# Patient Record
Sex: MALE | Race: WHITE | Employment: OTHER
[De-identification: names, ages, dates, MRNs, and addresses within clinical notes are randomized per-mention and may not be internally consistent; named-entity substitution may affect disease eponyms.]

---

## 2022-06-21 ENCOUNTER — TRANSCRIBE ORDERS (OUTPATIENT)
Dept: OTHER | Age: 72
End: 2022-06-21
Payer: COMMERCIAL

## 2022-06-21 DIAGNOSIS — I73.9 PERIPHERAL VASCULAR DISEASE (H): Primary | ICD-10-CM

## 2022-06-24 ENCOUNTER — TELEPHONE (OUTPATIENT)
Dept: VASCULAR SURGERY | Facility: CLINIC | Age: 72
End: 2022-06-24

## 2022-06-24 NOTE — TELEPHONE ENCOUNTER
New vascular referral   Referral Type: Vascular Surgery   Preferred Location: [facility] Vascular - Clinics & Surgery Center [facility]   Scheduling Instructions: Please call to schedule your appointment     Referring provider: Janeth Cotter NP @ [facility]   Phone [phone]   Fax [phone]      Most recent imaging? - 5/22: CTA aorta/iliac (awaiting imaging)    Additional info: 71 y/o male with PMHx iliac artery angioplasty and stenting (2005). 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 He is referred to [facility] vascular for further workup and treatment.    Will route this to IR team to discuss with their RNCC who typically sees AVM patients.    THERESA Graham, RN  RNCC - [facility] Vascular Surgery  Ph: [phone]  Fax: [phone]

## 2022-06-28 NOTE — TELEPHONE ENCOUNTER
I called and spoke with Deni.  His son  last week and he has been having a rough time with that.  He has pain in his calves bilateral and is only able to walk about 50 feet, he uses a cane/walker.  He does have low back issues and hasn't fallen yet, but feels unsteady.  He has lost muscle mass.  He continues to smoke.  He is getting a CT chest asap at [facility] in [place], he is also get's his care at [facility].  I will get the CYNDIE, US bilat venous duplex (hx bilat dvt and PE last fall), angio procedure imaging from [facility] for review and then contact pt for needed appointments.  MARGARETH Hassan RN, BSN  Interventional Radiology Nurse Coordinator   Phone:  [phone]

## 2022-07-05 ENCOUNTER — TELEPHONE (OUTPATIENT)
Dept: VASCULAR SURGERY | Facility: CLINIC | Age: 72
End: 2022-07-05

## 2022-07-05 DIAGNOSIS — Q27.9 VASCULAR MALFORMATION: Primary | ICD-10-CM

## 2022-07-05 NOTE — TELEPHONE ENCOUNTER
[facility] Call Center    Phone Message    May a detailed message be left on voicemail: no     Reason for Call: Other: I73.9 (ICD-10-CM) - Peripheral vascular disease (H)/ Referral by: RICH MARTÍNEZ A- Leslie Cheema at: [phone]     Action Taken: Message routed to:  [facility] ([facility]): [facility]    Travel Screening: Not Applicable

## 2022-07-06 ENCOUNTER — TELEPHONE (OUTPATIENT)
Dept: VASCULAR SURGERY | Facility: CLINIC | Age: 72
End: 2022-07-06

## 2022-07-06 NOTE — TELEPHONE ENCOUNTER
[facility] Call Center    Phone Message    May a detailed message be left on voicemail: yes     Reason for Call: Other: Pt called back to be scheduled.  Referral from     Janeth Cotter NP @ [facility]  Phone [phone]  Fax [phone]      For Peripheral vascular disease (H) [I73.9].     Please call Pt back to schedule. Thank you.     Action Taken: Message routed to:  [facility] ([facility]): Vascular    Travel Screening: Not Applicable

## 2022-07-06 NOTE — TELEPHONE ENCOUNTER
Called and spoke with patient regarding scheduling. Told patient we are waiting on imaging to be received. Request for imaging is sent. Explained to patient that once we receive that and a provider reviews it we'll be able to get him scheduled.     Gem Patel RN, BSN   Interventional Radiology CC   [phone]

## 2022-07-11 NOTE — TELEPHONE ENCOUNTER
Yahaira from [employer] calling to follow-up and ensure that the vet receives a call back to schedule the referral. Deni [phone]

## 2022-08-04 NOTE — TELEPHONE ENCOUNTER
DIAGNOSIS: MRI left leg and MRA left leg to be done here at the [facility], these are specially protocolled for vascular malformations.    DATE RECEIVED: 8.31.22   NOTES STATUS DETAILS   OFFICE NOTE from referring provider CE 7.14.22, 6.14.22, 6.8.22  [facility]   OFFICE NOTE from other specialist CE 5.24.22, 3.30.22  [facility]   MEDICATION LIST CE    XRAYS (IMAGES & REPORTS) Pacs *sched* for 8.29.22  MRA Low Ext Left  MR Femur Thigh Left    7.6.22  CT Chest    5.24.22  IR Abd    5.24.22  US Vascular Access    4.26.22  CTA Abd/Pelvis    3.30.22  US Doppler Artery Leg Billy    1.26.22  US Doppler PVR Seg Pressre

## 2022-08-29 ENCOUNTER — HOSPITAL ENCOUNTER (OUTPATIENT)
Dept: MRI IMAGING | Facility: CLINIC | Age: 72
Discharge: HOME | End: 2022-08-29
Attending: RADIOLOGY | Admitting: RADIOLOGY
Payer: COMMERCIAL

## 2022-08-29 DIAGNOSIS — Q27.9 VASCULAR MALFORMATION: ICD-10-CM

## 2022-08-29 PROCEDURE — 255N000002 HC RX 255 OP 636: Performed by: RADIOLOGY

## 2022-08-29 PROCEDURE — 73720 MRI LWR EXTREMITY W/O&W/DYE: CPT | Mod: LT

## 2022-08-29 PROCEDURE — A9577 INJ MULTIHANCE: HCPCS | Performed by: RADIOLOGY

## 2022-08-29 PROCEDURE — 73725 MR ANG LWR EXT W OR W/O DYE: CPT | Mod: 26 | Performed by: RADIOLOGY

## 2022-08-29 PROCEDURE — 73725 MR ANG LWR EXT W OR W/O DYE: CPT | Mod: LT

## 2022-08-29 PROCEDURE — 73720 MRI LWR EXTREMITY W/O&W/DYE: CPT | Mod: 26 | Performed by: RADIOLOGY

## 2022-08-29 RX ADMIN — GADOBENATE DIMEGLUMINE 15 ML: 529 INJECTION, SOLUTION INTRAVENOUS at 12:34

## 2022-08-30 PROBLEM — J44.9 CHRONIC OBSTRUCTIVE PULMONARY DISEASE, UNSPECIFIED (H): Status: ACTIVE | Noted: 2022-08-30

## 2022-08-30 PROBLEM — I73.9 PVD (PERIPHERAL VASCULAR DISEASE) (H): Status: ACTIVE | Noted: 2022-05-24

## 2022-08-30 PROBLEM — F12.20 CONTINUOUS CANNABIS DEPENDENCE (H): Status: ACTIVE | Noted: 2022-08-30

## 2022-08-30 PROBLEM — Z86.711 PERSONAL HISTORY OF PULMONARY EMBOLISM: Status: ACTIVE | Noted: 2022-08-30

## 2022-08-30 PROBLEM — F33.1 MAJOR DEPRESSIVE DISORDER, RECURRENT, MODERATE (H): Status: ACTIVE | Noted: 2022-08-30

## 2022-08-30 PROBLEM — M47.817 SPONDYLOSIS OF LUMBOSACRAL SPINE WITHOUT MYELOPATHY: Status: ACTIVE | Noted: 2022-08-30

## 2022-08-30 PROBLEM — F10.21 OTHER AND UNSPECIFIED ALCOHOL DEPENDENCE, IN REMISSION: Status: ACTIVE | Noted: 2022-08-30

## 2022-08-30 PROBLEM — K63.5 POLYP OF COLON: Status: ACTIVE | Noted: 2022-08-30

## 2022-08-30 PROBLEM — Z87.81 PERSONAL HISTORY OF TRAUMATIC FRACTURE: Status: ACTIVE | Noted: 2022-08-30

## 2022-08-30 PROBLEM — F17.200 NICOTINE DEPENDENCE: Status: ACTIVE | Noted: 2022-08-30

## 2022-08-30 PROBLEM — G89.29 OTHER CHRONIC PAIN: Status: ACTIVE | Noted: 2022-08-30

## 2022-08-30 PROBLEM — H91.92 DEAFNESS IN LEFT EAR: Status: ACTIVE | Noted: 2022-08-30

## 2022-08-30 PROBLEM — K25.3 ACUTE GASTRIC ULCER: Status: ACTIVE | Noted: 2022-08-30

## 2022-08-30 PROBLEM — M25.519 SHOULDER PAIN: Status: ACTIVE | Noted: 2022-08-30

## 2022-08-30 PROBLEM — I26.99 PULMONARY EMBOLISM (H): Status: ACTIVE | Noted: 2022-08-30

## 2022-08-30 PROBLEM — E78.00 HYPERCHOLESTEROLEMIA: Status: ACTIVE | Noted: 2022-08-30

## 2022-08-30 PROBLEM — I73.9 CLAUDICATION OF BOTH LOWER EXTREMITIES (H): Status: ACTIVE | Noted: 2022-04-12

## 2022-08-30 PROBLEM — Z86.018 HISTORY OF ACOUSTIC NEUROMA: Status: ACTIVE | Noted: 2022-08-30

## 2022-08-30 PROBLEM — F41.1 GENERALIZED ANXIETY DISORDER: Status: ACTIVE | Noted: 2022-08-30

## 2022-08-30 PROBLEM — I77.9 PERIPHERAL ARTERIAL OCCLUSIVE DISEASE (H): Status: ACTIVE | Noted: 2022-08-30

## 2022-08-30 PROBLEM — E55.9 VITAMIN D DEFICIENCY: Status: ACTIVE | Noted: 2022-08-30

## 2022-08-30 PROBLEM — M79.606 PAIN IN LEG, UNSPECIFIED: Status: ACTIVE | Noted: 2022-08-30

## 2022-08-30 PROBLEM — M77.40 METATARSALGIA: Status: ACTIVE | Noted: 2022-08-30

## 2022-08-30 PROBLEM — E66.9 OBESITY: Status: ACTIVE | Noted: 2022-08-30

## 2022-08-30 PROBLEM — G64 DISORDER OF PERIPHERAL NERVOUS SYSTEM: Status: ACTIVE | Noted: 2022-08-30

## 2022-08-30 PROBLEM — M54.50 LOW BACK PAIN: Status: ACTIVE | Noted: 2022-08-30

## 2022-08-30 PROBLEM — F10.10 CHRONIC ALCOHOL ABUSE: Status: ACTIVE | Noted: 2022-08-30

## 2022-08-30 PROBLEM — R06.02 SHORTNESS OF BREATH: Status: ACTIVE | Noted: 2022-08-30

## 2022-08-31 ENCOUNTER — PRE VISIT (OUTPATIENT)
Dept: RADIOLOGY | Facility: CLINIC | Age: 72
End: 2022-08-31

## 2022-08-31 ENCOUNTER — VIRTUAL VISIT (OUTPATIENT)
Dept: RADIOLOGY | Facility: CLINIC | Age: 72
End: 2022-08-31
Attending: RADIOLOGY
Payer: COMMERCIAL

## 2022-08-31 DIAGNOSIS — I99.9 VASCULAR LESION: Primary | ICD-10-CM

## 2022-08-31 PROCEDURE — 99207 PR NO BILLABLE SERVICE THIS VISIT: CPT | Performed by: RADIOLOGY

## 2022-08-31 RX ORDER — GABAPENTIN 300 MG/1
600 CAPSULE ORAL
COMMUNITY
Start: 2022-03-02

## 2022-08-31 RX ORDER — TIOTROPIUM BROMIDE 18 UG/1
CAPSULE ORAL; RESPIRATORY (INHALATION)
COMMUNITY

## 2022-08-31 RX ORDER — VENLAFAXINE 75 MG/1
75 TABLET ORAL
COMMUNITY

## 2022-08-31 RX ORDER — LIDOCAINE 50 MG/G
PATCH TOPICAL
Status: ON HOLD | COMMUNITY
Start: 2022-06-22 | End: 2023-03-07

## 2022-08-31 RX ORDER — ACETAMINOPHEN 500 MG
1000 TABLET ORAL
COMMUNITY
Start: 2022-06-08

## 2022-08-31 RX ORDER — ALBUTEROL SULFATE 90 UG/1
AEROSOL, METERED RESPIRATORY (INHALATION)
COMMUNITY
Start: 2022-05-02

## 2022-08-31 RX ORDER — MIRTAZAPINE 30 MG/1
60 TABLET, FILM COATED ORAL
COMMUNITY
Start: 2022-06-08

## 2022-08-31 NOTE — PROGRESS NOTES
Deni is a 72 year old who is being evaluated via a billable telephone visit.      Patient stated he is in the state of [place] for the visit today.    What phone number would you like to be contacted at? [phone]  How would you like to obtain your AVS? Lucianhart      Patient unable to come to visit. Will reschedule.    Romelia Mai MD  Interventional Radiology   Pager [phone]

## 2022-08-31 NOTE — NURSING NOTE
Deni Salcido 72 year old male presents today to discuss MRI results.    Alisson Herrera, Virtual Facilitator

## 2022-08-31 NOTE — LETTER
8/31/2022         RE: Deni Salcido  [address] [place], [place] [address] [address]   [address]  [place] [address]        Dear Colleague,    Thank you for referring your patient, Deni Salcido, to the [facility]. Please see a copy of my visit note below.    Deni is a 72 year old who is being evaluated via a billable telephone visit.      Patient stated he is in the state of [place] for the visit today.    What phone number would you like to be contacted at? [phone]  How would you like to obtain your AVS? MyChart      Patient unable to come to visit. Will reschedule.        Again, thank you for allowing me to participate in the care of your patient.      Sincerely,    Romelia Mai MD

## 2022-10-05 ENCOUNTER — OFFICE VISIT (OUTPATIENT)
Dept: DERMATOLOGY | Facility: CLINIC | Age: 72
End: 2022-10-05
Attending: RADIOLOGY
Payer: COMMERCIAL

## 2022-10-05 VITALS — HEIGHT: 75 IN | BODY MASS INDEX: 33.11 KG/M2 | WEIGHT: 266.32 LBS

## 2022-10-05 DIAGNOSIS — I99.9 VASCULAR LESION: Primary | ICD-10-CM

## 2022-10-05 PROCEDURE — 99205 OFFICE O/P NEW HI 60 MIN: CPT | Performed by: RADIOLOGY

## 2022-10-05 PROCEDURE — G0463 HOSPITAL OUTPT CLINIC VISIT: HCPCS

## 2022-10-05 ASSESSMENT — PAIN SCALES - GENERAL: PAINLEVEL: WORST PAIN (10)

## 2022-10-05 NOTE — LETTER
"10/5/2022      RE: Deni Salcido  [address]  [place] [address]     Dear Colleague,    Thank you for the opportunity to participate in the care of your patient, Deni Salcido, at the [facility] at [facility]. Please see a copy of my visit note below.        INTERVENTIONAL RADIOLOGY CONSULTATION  -  Name: Deni Salcido  Age: 72 year old   Referring Physician: Cyndee Fowler NP [facility].  REASON FOR REFERRAL: Vascular lesion    HPI: Mr. Salcido is referred by the [facility] to discuss treatment options for a vascular lesion.    He is a 72-year-old male with complex medical history including PE, COPD, claudication and peripheral arterial disease status post iliac artery angioplasty and stenting in .  Due to some concern for left leg claudication symptoms, he underwent a left lower extremity angiogram, there was concern on that study for an AVM.     His son recently , and we spent much of our visit discussing this.  He has been understandably grief stricken by this.  He however feels safe in his home and is not a threat to himself.    With regards to his left leg, he denies left thigh or groin pain or swelling.  He denies vascular claudication.    However, he spent much of the visit discussing his severe back pain and radiating pain to both legs.  He notices that he does have some relief of his pain with bending over. The pain is daily, \" excruciating\" and limits his function.    Review of systems is negative for fever, dyspnea, cough, chest pain, nausea or vomiting, lower extremity swelling or discoloration.        PAST MEDICAL HISTORY:   No past medical history on file.    PAST SURGICAL HISTORY:   No past surgical history on file.    FAMILY HISTORY:   No family history on file.    SOCIAL HISTORY:   Social History     Tobacco Use     Smoking status: Current Every Day Smoker     Types: " Cigarettes     Smokeless tobacco: Never Used   Substance Use Topics     Alcohol use: Not on file       PROBLEM LIST:   Patient Active Problem List    Diagnosis Date Noted     Acute gastric ulcer 08/30/2022     Priority: Medium     4/18 EGD  Jun 01, 2018 Entered By: LANDON STANFORD Comment: 4/18 EGD       Chronic obstructive pulmonary disease, unspecified (H) 08/30/2022     Priority: Medium     Feb 04, 2021 Entered By: TANYA VILLANUEVA Comment: PFTs SCVA 1/2021       Chronic alcohol abuse 08/30/2022     Priority: Medium     Continuous cannabis dependence (H) 08/30/2022     Priority: Medium     Deafness in left ear 08/30/2022     Priority: Medium     Degeneration of intervertebral disc 08/30/2022     Priority: Medium     Disorder of peripheral nervous system 08/30/2022     Priority: Medium     Generalized anxiety disorder 08/30/2022     Priority: Medium     History of acoustic neuroma 08/30/2022     Priority: Medium     Hypercholesterolemia 08/30/2022     Priority: Medium     Low back pain 08/30/2022     Priority: Medium     Metatarsalgia 08/30/2022     Priority: Medium     Major depressive disorder, recurrent, moderate (H) 08/30/2022     Priority: Medium     Nicotine dependence 08/30/2022     Priority: Medium     8/12 Chantix  Jan 16, 2013 Entered By: LANDON STANFORD Comment: 8/12 Chantix       Obesity 08/30/2022     Priority: Medium     Other and unspecified alcohol dependence, in remission 08/30/2022     Priority: Medium     Other chronic pain 08/30/2022     Priority: Medium     Pulmonary embolism (H) 08/30/2022     Priority: Medium     Sep 09, 2021 Entered By: TANYA VILLANUEVA Comment: 9/9/2021, [facility] ER, Bl PE, DVT, pneumonia       Pain in leg, unspecified 08/30/2022     Priority: Medium     Peripheral arterial occlusive disease (H) 08/30/2022     Priority: Medium     2005 Guidant Omnilink stent to left proximal common iliac, [facility]  May 30, 2017 Entered By: LANDON STANFORD Comment:  2005 Guidant Omnilink stent to left proximal common iliac, [facility]       Personal history of pulmonary embolism 2022     Priority: Medium     Personal history of traumatic fracture 2022     Priority: Medium     Polyp of colon 2022     Priority: Medium     Shortness of breath 2022     Priority: Medium     Shoulder pain 2022     Priority: Medium     Spondylosis of lumbosacral spine without myelopathy 2022     Priority: Medium     May 02, 2018 Entered By: LANDON STANFORD Comment:  MRI, mild spondylosis, no impingement       Vitamin D deficiency 2022     Priority: Medium     PVD (peripheral vascular disease) (H) 2022     Priority: Medium     Claudication of both lower extremities (H) 2022     Priority: Medium     Formatting of this note might be different from the original.  Added automatically from request for surgery [id number]         MEDICATIONS:   Prescription Medications as of 10/5/2022       Rx Number Disp Refills Start End Last Dispensed Date Next Fill Date Owning Pharmacy    acetaminophen (TYLENOL) 500 MG tablet    2022        Si,000 mg    Class: Historical    albuterol (PROAIR HFA/PROVENTIL HFA/VENTOLIN HFA) 108 (90 Base) MCG/ACT inhaler    2022        Sig: INHALE 2 PUFFS BY MOUTH FOUR TIMES A DAY AS NEEDED FOR BREATHING, WAIT AT LEAST 1-5 MINUTES BETWEEN EACH PUFF AS NEED FOR SHORTNESS OF BREATH    Class: Historical    gabapentin (NEURONTIN) 300 MG capsule    3/2/2022        Si mg    Class: Historical    lidocaine (LIDODERM) 5 % patch    2022        Class: Historical    Route: Transdermal    mirtazapine (REMERON) 30 MG tablet    2022        Si mg    Class: Historical    tiotropium (SPIRIVA) 18 MCG inhaled capsule            Class: Historical    Route: Inhalation    venlafaxine (EFFEXOR) 75 MG tablet            Sig: Take 75 mg by mouth    Class: Historical    Route: Oral          ALLERGIES:   Other  "environmental allergy    ROS:  As above      Physical Examination:   VITALS:   Ht 1.9 m (6' 2.8\")   Wt 120.8 kg (266 lb 5.1 oz)   BMI 33.46 kg/m    Constitutional: alert and no distress  Head: Normocephalic.   ENT: OP clear  Cardiovascular: RRR.   Respiratory: CTAB  Skin: No jaundice  Psychiatric: crying when talking about his son's recent death. Judgment and insight intact and mentation appears normal.  Vascular: No bruits are noted.  Left thigh without skin breakdown or increased visible vascularity.   Brachial  Radial  Ulnar  Femoral  Popliteal  DP  PT    Left       2/2  2/2    Right       2/2  2/2        Labs:    BMP RESULTS:  No results found for: NA, POTASSIUM, CHLORIDE, CO2, ANIONGAP, GLC, BUN, CR, GFRESTIMATED, GFRESTBLACK, TUSHAR     CBC RESULTS:  No results found for: WBC, RBC, HGB, HCT, MCV, MCH, MCHC, RDW, PLT    INR/PTT:  No results found for: INR, PTT    Diagnostic studies:   MRI of the left lower extremity without and with contrast and time resolved MR angiogram bilateral lower extremities.  The MRI demonstrates no increased vascularity or flow voids to suggest AVM or other vascular malformation.  There is no muscular edema or soft tissue overgrowth.  Time resolved MR angiogram shows discrete early filling of the right common femoral vein.  Bilateral lower extremity arteries are patent.  No evidence of AVM.    Radiologist interpretation:  IMPRESSION:  1.  No MRI evidence of vascular malformation is identified in the  visualized pelvis and bilateral thigh/femur. Refer to same day lower  extremity MRA to better identify questioned arteriovenous fistula.  2.  Moderate bilateral greater trochanteric bursitis.     Impression:     1. Left leg: Early draining common femoral vein, likely with  contribution from profunda femoris artery vs superficial femoral  artery. 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      2. Right leg: Patent exam of the " right lower extremity.         Assessment: 72-year-old male with history of COPD, pulmonary embolism, peripheral arterial disease with recent angiogram showing no significant disease but concern for an arteriovenous malformation.  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  I told him that this is very good news.  I explained that an AV fistula is treatable by either embolization or surgery.  If architecturally favorable, and embolization can be pursued.  I discussed the process of angiography and embolization including the risks of arterial injury, access site bleeding, nontarget embolization or technical failure or inability to close the fistula due to unfavorable architecture. I explained that if the architecture is unfavorable, a surgical approach will be needed.  He would like to proceed.    He also spent a significant amount of our visit time discussing his back and leg pain, which appears to very possibly be neurologic/degenerative disease in origin, although I am uncertain as this is not my area of expertise.  Given his severity of symptoms, he needs an MRI and a referral to see a neurosurgeon.    Plan:  1. AVF. Characterize with angiogram and embolize if architecture allows.  2. Referral to neurology and neurosurgery is needed with history of back and leg pain and DJD.    It was a pleasure to conduct this in-person visit with Mr. Salcido today.  Thank you for involving the interventional radiology service in his care.    I spent a total of 80 minutes face-to-face time on today's in person clinic visit, over 50% time was for counseling and care coordination.  In addition I spent 10 minutes reviewing imaging and 10 minutes completing documentation.    Romelia Mai MD  Interventional Radiology   Pager [phone]        CC  Patient Care Team:  No Ref-Primary, Physician as PCP - General  SELF, REFERRED          Please do not hesitate to contact me if you  have any questions/concerns.     Sincerely,       Romelia Mai MD

## 2022-10-05 NOTE — LETTER
Date:November 3, 2022      Patient was self referred, no letter generated. Do not send.        [facility] Health Information

## 2022-10-05 NOTE — PROGRESS NOTES
"    INTERVENTIONAL RADIOLOGY CONSULTATION  -  Name: Deni Salcido  Age: 72 year old   Referring Physician: Cyndee Fowler NP [facility].  REASON FOR REFERRAL: Vascular lesion    HPI: Mr. Salcido is referred by the  to discuss treatment options for a vascular lesion.    He is a 72-year-old male with complex medical history including PE, COPD, claudication and peripheral arterial disease status post iliac artery angioplasty and stenting in .  Due to some concern for left leg claudication symptoms, he underwent a left lower extremity angiogram, there was concern on that study for an AVM.     His son recently , and we spent much of our visit discussing this.  He has been understandably grief stricken by this.  He however feels safe in his home and is not a threat to himself.    With regards to his left leg, he denies left thigh or groin pain or swelling.  He denies vascular claudication.    However, he spent much of the visit discussing his severe back pain and radiating pain to both legs.  He notices that he does have some relief of his pain with bending over. The pain is daily, \" excruciating\" and limits his function.    Review of systems is negative for fever, dyspnea, cough, chest pain, nausea or vomiting, lower extremity swelling or discoloration.        PAST MEDICAL HISTORY:   No past medical history on file.    PAST SURGICAL HISTORY:   No past surgical history on file.    FAMILY HISTORY:   No family history on file.    SOCIAL HISTORY:   Social History     Tobacco Use     Smoking status: Current Every Day Smoker     Types: Cigarettes     Smokeless tobacco: Never Used   Substance Use Topics     Alcohol use: Not on file       PROBLEM LIST:   Patient Active Problem List    Diagnosis Date Noted     Acute gastric ulcer 2022     Priority: Medium      EGD  2018 Entered By: LANDON STANFORD Comment:  EGD       Chronic obstructive pulmonary disease, unspecified (H) " 08/30/2022     Priority: Medium     Feb 04, 2021 Entered By: TANYA VILLANUEVA Comment: PFTs [facility] 1/2021       Chronic alcohol abuse 08/30/2022     Priority: Medium     Continuous cannabis dependence (H) 08/30/2022     Priority: Medium     Deafness in left ear 08/30/2022     Priority: Medium     Degeneration of intervertebral disc 08/30/2022     Priority: Medium     Disorder of peripheral nervous system 08/30/2022     Priority: Medium     Generalized anxiety disorder 08/30/2022     Priority: Medium     History of acoustic neuroma 08/30/2022     Priority: Medium     Hypercholesterolemia 08/30/2022     Priority: Medium     Low back pain 08/30/2022     Priority: Medium     Metatarsalgia 08/30/2022     Priority: Medium     Major depressive disorder, recurrent, moderate (H) 08/30/2022     Priority: Medium     Nicotine dependence 08/30/2022     Priority: Medium     8/12 Chantix  Jan 16, 2013 Entered By: LANDON STANFORD Comment: 8/12 Chantix       Obesity 08/30/2022     Priority: Medium     Other and unspecified alcohol dependence, in remission 08/30/2022     Priority: Medium     Other chronic pain 08/30/2022     Priority: Medium     Pulmonary embolism (H) 08/30/2022     Priority: Medium     Sep 09, 2021 Entered By: TANYA VILLANUEVA Comment: 9/9/2021, [facility] ER, Bl PE, DVT, pneumonia       Pain in leg, unspecified 08/30/2022     Priority: Medium     Peripheral arterial occlusive disease (H) 08/30/2022     Priority: Medium     2005 Guidant Omnilink stent to left proximal common iliac, [facility]  May 30, 2017 Entered By: LANDON STANFORD Comment: 2005 Guidant Omnilink stent to left proximal common iliac, [facility]       Personal history of pulmonary embolism 08/30/2022     Priority: Medium     Personal history of traumatic fracture 08/30/2022     Priority: Medium     Polyp of colon 08/30/2022     Priority: Medium     Shortness of breath 08/30/2022     Priority: Medium     Shoulder pain  "2022     Priority: Medium     Spondylosis of lumbosacral spine without myelopathy 2022     Priority: Medium     May 02, 2018 Entered By: LANDON STANFORD Comment:  MRI, mild spondylosis, no impingement       Vitamin D deficiency 2022     Priority: Medium     PVD (peripheral vascular disease) (H) 2022     Priority: Medium     Claudication of both lower extremities (H) 2022     Priority: Medium     Formatting of this note might be different from the original.  Added automatically from request for surgery [id number]         MEDICATIONS:   Prescription Medications as of 10/5/2022       Rx Number Disp Refills Start End Last Dispensed Date Next Fill Date Owning Pharmacy    acetaminophen (TYLENOL) 500 MG tablet    2022        Si,000 mg    Class: Historical    albuterol (PROAIR HFA/PROVENTIL HFA/VENTOLIN HFA) 108 (90 Base) MCG/ACT inhaler    2022        Sig: INHALE 2 PUFFS BY MOUTH FOUR TIMES A DAY AS NEEDED FOR BREATHING, WAIT AT LEAST 1-5 MINUTES BETWEEN EACH PUFF AS NEED FOR SHORTNESS OF BREATH    Class: Historical    gabapentin (NEURONTIN) 300 MG capsule    3/2/2022        Si mg    Class: Historical    lidocaine (LIDODERM) 5 % patch    2022        Class: Historical    Route: Transdermal    mirtazapine (REMERON) 30 MG tablet    2022        Si mg    Class: Historical    tiotropium (SPIRIVA) 18 MCG inhaled capsule            Class: Historical    Route: Inhalation    venlafaxine (EFFEXOR) 75 MG tablet            Sig: Take 75 mg by mouth    Class: Historical    Route: Oral          ALLERGIES:   Other environmental allergy    ROS:  As above      Physical Examination:   VITALS:   Ht 1.9 m (6' 2.8\")   Wt 120.8 kg (266 lb 5.1 oz)   BMI 33.46 kg/m    Constitutional: alert and no distress  Head: Normocephalic.   ENT: OP clear  Cardiovascular: RRR.   Respiratory: CTAB  Skin: No jaundice  Psychiatric: crying when talking about his son's recent death. Judgment and " insight intact and mentation appears normal.  Vascular: No bruits are noted.  Left thigh without skin breakdown or increased visible vascularity.   Brachial  Radial  Ulnar  Femoral  Popliteal  DP  PT    Left       2/2  2/2    Right       2/2  2/2        Labs:    BMP RESULTS:  No results found for: NA, POTASSIUM, CHLORIDE, CO2, ANIONGAP, GLC, BUN, CR, GFRESTIMATED, GFRESTBLACK, TUSHAR     CBC RESULTS:  No results found for: WBC, RBC, HGB, HCT, MCV, MCH, MCHC, RDW, PLT    INR/PTT:  No results found for: INR, PTT    Diagnostic studies:   MRI of the left lower extremity without and with contrast and time resolved MR angiogram bilateral lower extremities.  The MRI demonstrates no increased vascularity or flow voids to suggest AVM or other vascular malformation.  There is no muscular edema or soft tissue overgrowth.  Time resolved MR angiogram shows discrete early filling of the right common femoral vein.  Bilateral lower extremity arteries are patent.  No evidence of AVM.    Radiologist interpretation:  IMPRESSION:  1.  No MRI evidence of vascular malformation is identified in the  visualized pelvis and bilateral thigh/femur. Refer to same day lower  extremity MRA to better identify questioned arteriovenous fistula.  2.  Moderate bilateral greater trochanteric bursitis.     Impression:     1. Left leg: Early draining common femoral vein, likely with  contribution from profunda femoris artery vs superficial femoral  artery. 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      2. Right leg: Patent exam of the right lower extremity.         Assessment: 72-year-old male with history of COPD, pulmonary embolism, peripheral arterial disease with recent angiogram showing no significant disease but concern for an arteriovenous malformation.  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  I told him that this is very good news.  I  explained that an AV fistula is treatable by either embolization or surgery.  If architecturally favorable, and embolization can be pursued.  I discussed the process of angiography and embolization including the risks of arterial injury, access site bleeding, nontarget embolization or technical failure or inability to close the fistula due to unfavorable architecture. I explained that if the architecture is unfavorable, a surgical approach will be needed.  He would like to proceed.    He also spent a significant amount of our visit time discussing his back and leg pain, which appears to very possibly be neurologic/degenerative disease in origin, although I am uncertain as this is not my area of expertise.  Given his severity of symptoms, he needs an MRI and a referral to see a neurosurgeon.    Plan:  1. AVF. Characterize with angiogram and embolize if architecture allows.  2. Referral to neurology and neurosurgery is needed with history of back and leg pain and DJD.    It was a pleasure to conduct this in-person visit with Mr. Salcido today.  Thank you for involving the interventional radiology service in his care.    I spent a total of 80 minutes face-to-face time on today's in person clinic visit, over 50% time was for counseling and care coordination.  In addition I spent 10 minutes reviewing imaging and 10 minutes completing documentation.    Romelia Mai MD  Interventional Radiology   Pager [phone]        CC  Patient Care Team:  No Ref-Primary, Physician as PCP - General  SELF, REFERRED

## 2022-10-05 NOTE — NURSING NOTE
"[facility] [[id number]]  Chief Complaint   Patient presents with     Consult     Vascular Lesion.     Initial Ht 6' 2.8\" (190 cm)   Wt 266 lb 5.1 oz (120.8 kg)   BMI 33.46 kg/m   Estimated body mass index is 33.46 kg/m  as calculated from the following:    Height as of this encounter: 6' 2.8\" (190 cm).    Weight as of this encounter: 266 lb 5.1 oz (120.8 kg).  Medication Reconciliation: complete    Does the patient need any medication refills today? No    Does the patient/parent need MyChart or Proxy acces today? No    Has the patient had their flu shot for this year? No    Would you like a flu shot today? No    Would you like the Covid vaccine today? No     Yessenia Sinha CMA        "

## 2022-11-15 ENCOUNTER — TELEPHONE (OUTPATIENT)
Dept: RADIOLOGY | Facility: CLINIC | Age: 72
End: 2022-11-15

## 2022-11-15 DIAGNOSIS — I77.0 A-V FISTULA (H): Primary | ICD-10-CM

## 2022-11-15 NOTE — TELEPHONE ENCOUNTER
I called and spoke with Mr Salcido.  I will schedule him for Angio with Dr Mai in January.  [place] [facility] get [facility] and [place].  Pt will need a later start time. I will mail a letter with procedure details. His PCP [facility] office called and left me a voicemail, they will address the neurogenic claudication low back concerns.  MARGARETH Hassan RN, BSN  Interventional Radiology Nurse Coordinator   Phone:  [phone]

## 2022-12-16 ENCOUNTER — TELEPHONE (OUTPATIENT)
Dept: VASCULAR SURGERY | Facility: CLINIC | Age: 72
End: 2022-12-16

## 2022-12-16 NOTE — TELEPHONE ENCOUNTER
[facility] Call Center    Phone Message    May a detailed message be left on voicemail: no     Reason for Call: Other: Belinda from the [facility] would like a call back to discuss if the AVF procedure is still the treatment plan. Belinda states the authorization for vascular expires on 2/27/23. Please call Belinda or Elvia at [phone] to discuss. Thank you.      Action Taken: Message routed to:  [facility] Vascular    Travel Screening: Not Applicable

## 2022-12-16 NOTE — TELEPHONE ENCOUNTER
I returned the call, letting [facility] know plan is to treat AVF in January with Dr Mai,    I have provided my contact information.  MARGARETH Hassan, RN, BSN  Interventional Radiology Nurse Coordinator   Phone:  [phone]

## 2023-01-09 RX ORDER — HEPARIN SODIUM 200 [USP'U]/100ML
1 INJECTION, SOLUTION INTRAVENOUS CONTINUOUS PRN
Status: CANCELLED | OUTPATIENT
Start: 2023-01-09

## 2023-03-07 ENCOUNTER — TRANSFERRED RECORDS (OUTPATIENT)
Dept: HEALTH INFORMATION MANAGEMENT | Facility: CLINIC | Age: 73
End: 2023-03-07

## 2023-03-07 ENCOUNTER — APPOINTMENT (OUTPATIENT)
Dept: MEDSURG UNIT | Facility: CLINIC | Age: 73
End: 2023-03-07
Payer: COMMERCIAL

## 2023-03-07 ENCOUNTER — HOSPITAL ENCOUNTER (OUTPATIENT)
Facility: CLINIC | Age: 73
Discharge: HOME | End: 2023-03-07
Attending: RADIOLOGY | Admitting: RADIOLOGY
Payer: COMMERCIAL

## 2023-03-07 ENCOUNTER — APPOINTMENT (OUTPATIENT)
Dept: INTERVENTIONAL RADIOLOGY/VASCULAR | Facility: CLINIC | Age: 73
End: 2023-03-07
Attending: RADIOLOGY
Payer: COMMERCIAL

## 2023-03-07 VITALS
HEART RATE: 70 BPM | BODY MASS INDEX: 32.36 KG/M2 | OXYGEN SATURATION: 95 % | TEMPERATURE: 98.4 F | RESPIRATION RATE: 20 BRPM | DIASTOLIC BLOOD PRESSURE: 82 MMHG | WEIGHT: 260.3 LBS | HEIGHT: 75 IN | SYSTOLIC BLOOD PRESSURE: 156 MMHG

## 2023-03-07 DIAGNOSIS — I77.0 A-V FISTULA (H): ICD-10-CM

## 2023-03-07 LAB
ANION GAP SERPL CALCULATED.3IONS-SCNC: 11 MMOL/L (ref 7–15)
ATRIAL RATE - MUSE: 67 BPM
BUN SERPL-MCNC: 12.3 MG/DL (ref 8–23)
CALCIUM SERPL-MCNC: 9.3 MG/DL (ref 8.8–10.2)
CHLORIDE SERPL-SCNC: 107 MMOL/L (ref 98–107)
CREAT SERPL-MCNC: 0.91 MG/DL (ref 0.67–1.17)
DEPRECATED HCO3 PLAS-SCNC: 24 MMOL/L (ref 22–29)
DIASTOLIC BLOOD PRESSURE - MUSE: NORMAL MMHG
ERYTHROCYTE [DISTWIDTH] IN BLOOD BY AUTOMATED COUNT: 14.6 % (ref 10–15)
GFR SERPL CREATININE-BSD FRML MDRD: 90 ML/MIN/1.73M2
GLUCOSE SERPL-MCNC: 150 MG/DL (ref 70–99)
HCT VFR BLD AUTO: 47.6 % (ref 40–53)
HGB BLD-MCNC: 15.3 G/DL (ref 13.3–17.7)
INR PPP: 0.94 (ref 0.85–1.15)
INTERPRETATION ECG - MUSE: NORMAL
MCH RBC QN AUTO: 30.9 PG (ref 26.5–33)
MCHC RBC AUTO-ENTMCNC: 32.1 G/DL (ref 31.5–36.5)
MCV RBC AUTO: 96 FL (ref 78–100)
P AXIS - MUSE: 49 DEGREES
PLATELET # BLD AUTO: 187 10E3/UL (ref 150–450)
POTASSIUM SERPL-SCNC: 4.3 MMOL/L (ref 3.4–5.3)
PR INTERVAL - MUSE: 162 MS
QRS DURATION - MUSE: 80 MS
QT - MUSE: 370 MS
QTC - MUSE: 390 MS
R AXIS - MUSE: -7 DEGREES
RBC # BLD AUTO: 4.95 10E6/UL (ref 4.4–5.9)
SODIUM SERPL-SCNC: 142 MMOL/L (ref 136–145)
SYSTOLIC BLOOD PRESSURE - MUSE: NORMAL MMHG
T AXIS - MUSE: 33 DEGREES
VENTRICULAR RATE- MUSE: 67 BPM
WBC # BLD AUTO: 7.9 10E3/UL (ref 4–11)

## 2023-03-07 PROCEDURE — 76937 US GUIDE VASCULAR ACCESS: CPT | Mod: 26 | Performed by: RADIOLOGY

## 2023-03-07 PROCEDURE — 250N000011 HC RX IP 250 OP 636

## 2023-03-07 PROCEDURE — 93005 ELECTROCARDIOGRAM TRACING: CPT

## 2023-03-07 PROCEDURE — 36248 INS CATH ABD/L-EXT ART ADDL: CPT | Mod: GC | Performed by: RADIOLOGY

## 2023-03-07 PROCEDURE — 272N000504 HC NEEDLE CR4

## 2023-03-07 PROCEDURE — C1887 CATHETER, GUIDING: HCPCS

## 2023-03-07 PROCEDURE — 99152 MOD SED SAME PHYS/QHP 5/>YRS: CPT

## 2023-03-07 PROCEDURE — 272N000571 HC SHEATH CR8

## 2023-03-07 PROCEDURE — 36247 INS CATH ABD/L-EXT ART 3RD: CPT

## 2023-03-07 PROCEDURE — 75774 ARTERY X-RAY EACH VESSEL: CPT

## 2023-03-07 PROCEDURE — 36247 INS CATH ABD/L-EXT ART 3RD: CPT | Mod: GC | Performed by: RADIOLOGY

## 2023-03-07 PROCEDURE — C1769 GUIDE WIRE: HCPCS

## 2023-03-07 PROCEDURE — 999N000142 HC STATISTIC PROCEDURE PREP ONLY

## 2023-03-07 PROCEDURE — 250N000009 HC RX 250

## 2023-03-07 PROCEDURE — 255N000002 HC RX 255 OP 636: Performed by: RADIOLOGY

## 2023-03-07 PROCEDURE — 85610 PROTHROMBIN TIME: CPT | Performed by: NURSE PRACTITIONER

## 2023-03-07 PROCEDURE — 36415 COLL VENOUS BLD VENIPUNCTURE: CPT | Performed by: NURSE PRACTITIONER

## 2023-03-07 PROCEDURE — 272N000143 HC KIT CR3

## 2023-03-07 PROCEDURE — 75710 ARTERY X-RAYS ARM/LEG: CPT | Mod: 26 | Performed by: RADIOLOGY

## 2023-03-07 PROCEDURE — 82310 ASSAY OF CALCIUM: CPT | Performed by: NURSE PRACTITIONER

## 2023-03-07 PROCEDURE — 75774 ARTERY X-RAY EACH VESSEL: CPT | Mod: 26 | Performed by: RADIOLOGY

## 2023-03-07 PROCEDURE — 999N000134 HC STATISTIC PP CARE STAGE 3

## 2023-03-07 PROCEDURE — 272N000566 HC SHEATH CR3

## 2023-03-07 PROCEDURE — 85027 COMPLETE CBC AUTOMATED: CPT | Performed by: NURSE PRACTITIONER

## 2023-03-07 PROCEDURE — 76937 US GUIDE VASCULAR ACCESS: CPT

## 2023-03-07 PROCEDURE — 93010 ELECTROCARDIOGRAM REPORT: CPT | Mod: 59 | Performed by: INTERNAL MEDICINE

## 2023-03-07 PROCEDURE — 99152 MOD SED SAME PHYS/QHP 5/>YRS: CPT | Mod: GC | Performed by: RADIOLOGY

## 2023-03-07 RX ORDER — NALOXONE HYDROCHLORIDE 0.4 MG/ML
0.4 INJECTION, SOLUTION INTRAMUSCULAR; INTRAVENOUS; SUBCUTANEOUS
Status: DISCONTINUED | OUTPATIENT
Start: 2023-03-07 | End: 2023-03-07 | Stop reason: HOSPADM

## 2023-03-07 RX ORDER — FENTANYL CITRATE 50 UG/ML
25-50 INJECTION, SOLUTION INTRAMUSCULAR; INTRAVENOUS EVERY 5 MIN PRN
Status: DISCONTINUED | OUTPATIENT
Start: 2023-03-07 | End: 2023-03-07 | Stop reason: HOSPADM

## 2023-03-07 RX ORDER — SODIUM CHLORIDE 9 MG/ML
INJECTION, SOLUTION INTRAVENOUS CONTINUOUS
Status: DISCONTINUED | OUTPATIENT
Start: 2023-03-07 | End: 2023-03-07 | Stop reason: HOSPADM

## 2023-03-07 RX ORDER — NALOXONE HYDROCHLORIDE 0.4 MG/ML
0.2 INJECTION, SOLUTION INTRAMUSCULAR; INTRAVENOUS; SUBCUTANEOUS
Status: DISCONTINUED | OUTPATIENT
Start: 2023-03-07 | End: 2023-03-07 | Stop reason: HOSPADM

## 2023-03-07 RX ORDER — FLUMAZENIL 0.1 MG/ML
0.2 INJECTION, SOLUTION INTRAVENOUS
Status: DISCONTINUED | OUTPATIENT
Start: 2023-03-07 | End: 2023-03-07 | Stop reason: HOSPADM

## 2023-03-07 RX ORDER — LIDOCAINE 40 MG/G
CREAM TOPICAL
Status: DISCONTINUED | OUTPATIENT
Start: 2023-03-07 | End: 2023-03-07 | Stop reason: HOSPADM

## 2023-03-07 RX ORDER — IODIXANOL 320 MG/ML
100 INJECTION, SOLUTION INTRAVASCULAR ONCE
Status: COMPLETED | OUTPATIENT
Start: 2023-03-07 | End: 2023-03-07

## 2023-03-07 RX ADMIN — IODIXANOL 100 ML: 320 INJECTION, SOLUTION INTRAVASCULAR at 15:54

## 2023-03-07 RX ADMIN — FENTANYL CITRATE 50 MCG: 50 INJECTION, SOLUTION INTRAMUSCULAR; INTRAVENOUS at 15:17

## 2023-03-07 RX ADMIN — FENTANYL CITRATE 50 MCG: 50 INJECTION, SOLUTION INTRAMUSCULAR; INTRAVENOUS at 14:54

## 2023-03-07 RX ADMIN — MIDAZOLAM 1 MG: 1 INJECTION INTRAMUSCULAR; INTRAVENOUS at 15:17

## 2023-03-07 RX ADMIN — LIDOCAINE HYDROCHLORIDE 5 ML: 10 INJECTION, SOLUTION EPIDURAL; INFILTRATION; INTRACAUDAL; PERINEURAL at 15:48

## 2023-03-07 RX ADMIN — MIDAZOLAM 1 MG: 1 INJECTION INTRAMUSCULAR; INTRAVENOUS at 14:53

## 2023-03-07 RX ADMIN — FENTANYL CITRATE 50 MCG: 50 INJECTION, SOLUTION INTRAMUSCULAR; INTRAVENOUS at 14:40

## 2023-03-07 RX ADMIN — FENTANYL CITRATE 50 MCG: 50 INJECTION, SOLUTION INTRAMUSCULAR; INTRAVENOUS at 16:07

## 2023-03-07 RX ADMIN — MIDAZOLAM 1 MG: 1 INJECTION INTRAMUSCULAR; INTRAVENOUS at 14:40

## 2023-03-07 RX ADMIN — FENTANYL CITRATE 50 MCG: 50 INJECTION, SOLUTION INTRAMUSCULAR; INTRAVENOUS at 15:59

## 2023-03-07 ASSESSMENT — ACTIVITIES OF DAILY LIVING (ADL)
ADLS_ACUITY_SCORE: 35

## 2023-03-07 NOTE — PROGRESS NOTES
Pt on 2A per litter with RN post LE Angiogram, pt awake and alert, denies pain. Site at right groin is dry and intact,firm area marked, near site; MD aware; site and area around hematoma is soft. Family at bedside; family updated. Pt taking  PO fluids, milk, without problem. Will continue to monitor.     1655--report to Yun Guo RN; dr Mai and dr Hamlin to see pt post; per DR BARNES pt to restart Eliquis tomorrow morning.

## 2023-03-07 NOTE — PRE-PROCEDURE
GENERAL PRE-PROCEDURE:   Procedure:  LLE angiogram with possible intervention  Date/Time:  3/7/2023 12:44 PM    Written consent obtained?: Yes    Risks and benefits: Risks, benefits and alternatives were discussed    Consent given by:  Patient  Patient states understanding of procedure being performed: Yes    Patient's understanding of procedure matches consent: Yes    Procedure consent matches procedure scheduled: Yes    Expected level of sedation:  Moderate  Appropriately NPO:  Yes  ASA Class:  3  Mallampati  :  Grade 2- soft palate, base of uvula, tonsillar pillars, and portion of posterior pharyngeal wall visible  Lungs:  Lungs clear with good breath sounds bilaterally  Heart:  Normal heart sounds and rate  History & Physical reviewed:  History and physical reviewed and no updates needed  Statement of review:  I have reviewed the lab findings, diagnostic data, medications, and the plan for sedation

## 2023-03-07 NOTE — DISCHARGE INSTRUCTIONS
[facility]   Interventional Radiology  Discharge Instructions Post Peripheral Angiogram     AFTER YOU GO HOME:          Do relax and take it easy for 24 hours.       Do drink plenty of fluids.       Do resume your regular diet, unless otherwise instructed by your Primary Physician.       DO NOT smoke for at least 24 hours, if you were given any sedation.       DO NOT drink alcoholic beverages the day of your procedure.       Do not drive or operate machinery at home or at work for 24 hours.          DO NOT do any strenuous exercise or lifting for at least 2 days following your Procedure.       DO NOT take a bath or shower for at least 12 hours following your procedure.       DO NOT make any important or legal decisions for 24 hours following your procedure.    CALL THE PHYSICIAN IF:      - You start bleeding from the procedure site.  If you do start to bleed from the site, lie down flat and hold pressure on the site. A small lump or bruise is common at the puncture site.Your physician will tell you if you need to return to the hospital.      - You develop numbness, coolness or a change in color of the arm or leg that was punctured.      - You experience increased pain or redness at the puncture site.      - You develop hives or a rash or unexplained itching.      - You develop a temperature of 101 degrees F or greater    Additional Information:           Support the puncture site for coughing, sneezing, or moving your bowels for the first 48 hours  No tub bath, hot tubs, or swimming for 5 days  No lotion or powder to the puncture site for 3 days      Instructions for closure device: Re start Eliquis tomorrow morning per Dr Mai      [facility] INTERVENTIONAL RADIOLOGY DEPARTMENT         Procedure Physician:  Dr Mai and Dr Hamlin                          Date of Procedure: March 7, 2023       Telephone Numbers:   [phone]      Monday-Friday 7:30 am to 4:00 pm                                         [phone].....After 4:00 pm Monday-Friday, Weekends and  Holidays.   Ask for the Interventional Radiologist on call. Someone is on call 24 hrs/day.

## 2023-03-07 NOTE — PROCEDURES
[facility]    Procedure: IR Procedure Note    Date/Time: 3/7/2023 4:24 PM  Performed by: Jan Hamlin MD  Authorized by: Romelia Mai MD       UNIVERSAL PROTOCOL   Site Marked: NA  Prior Images Obtained and Reviewed:  Yes  Required items: Required blood products, implants, devices and special equipment available    Patient identity confirmed:  Verbally with patient, arm band, provided demographic data and hospital-assigned identification number  Patient was reevaluated immediately before administering moderate or deep sedation or anesthesia  Confirmation Checklist:  Patient's identity using two indicators, relevant allergies, procedure was appropriate and matched the consent or emergent situation and correct equipment/implants were available  Time out: Immediately prior to the procedure a time out was called    Universal Protocol: the Joint Commission Universal Protocol was followed    Preparation: Patient was prepped and draped in usual sterile fashion       ANESTHESIA    Anesthesia: Local infiltration  Local Anesthetic:  Lidocaine 1% without epinephrine      SEDATION  Patient Sedated: Yes    Sedation:  Fentanyl and midazolam  Vital signs: Vital signs monitored during sedation    See dictated procedure note for full details.  Findings: LLE diagnostic angiogram demonstrates opacification of early draining vein via numerous small collaterals    Specimens: none    Complications: None    Condition: Stable    Plan: LLE diagnostic angiogram demonstrates opacification of early draining vein via numerous small collaterals      PROCEDURE  Describe Procedure: LLE diagnostic angiogram demonstrates opacification of early draining vein via numerous small collaterals  Patient Tolerance:  Patient tolerated the procedure well with no immediate complications  Length of time physician/provider present for 1:1 monitoring during sedation: 60

## 2023-03-07 NOTE — PROGRESS NOTES
Patient Name: Deni Salcido  Medical Record Number: [id number]  Today's Date: 3/7/2023    Procedure: Left lower extremity angiogram  Proceduralist: Dr. Mai, Dr. Hamlin   Pathology present: na    Procedure Start: 1446  Procedure end: 1610    Sedation medications administered:    Fentanyl 250 mcg   Versed 3 mg    Sedation time: 90 minutes (1440 - 1610)    Report given to:  RN  : YESSI    Other Notes: Pt arrived to IR room 4 from . Consent reviewed. Pt denies any questions or concerns regarding procedure. Pt positioned supine and monitored per protocol.     Pt tolerated procedure without any noted complications.    Right Groin Access:  Perclose deployed at 1605.  Flat bedrest x 2 hours.  Ambulation time: 1805    Right groin with small hematoma. Dr. Hamlin came to assess.  No bleeding from puncture. No further intervention.   Pt transferred back to .

## 2023-03-07 NOTE — PROGRESS NOTES
Prep and teaching complete for LE ANGIOGRAM; pt awake and alert, denies pain. Has ride post procedure.   Awaiting consent and lab results. Last Eliquis dose 3/3 at 2200.

## 2023-03-08 NOTE — PROGRESS NOTES
Patient tolerated recovery stage well. VSS, right groin site clean/dry/intact,documented hematoma unchanged in size. Denies site pain but does have chronic back pain. Patient tolerated PO food and fluids. Teaching was done and discharge instructions were given. Patient ambulated, voided, and PIV was removed. Patient discharged from the hospital via wheel chair to home with his friend.

## 2023-03-30 ENCOUNTER — TELEPHONE (OUTPATIENT)
Dept: INTERVENTIONAL RADIOLOGY/VASCULAR | Facility: CLINIC | Age: 73
End: 2023-03-30

## 2023-03-30 NOTE — TELEPHONE ENCOUNTER
I returned Deni's call.  Pt had his IR intervention on 3/7/23 with Dr Mai.  I have faxed the report/recommendation [facility] and to his PCP NP at the [facility].   No further follow up in IR is needed.  MARGARETH Hassan, RN, BSN  Interventional Radiology Nurse Coordinator   Phone:  [phone]

## 2023-03-30 NOTE — TELEPHONE ENCOUNTER
[facility] Call Center    Phone Message    May a detailed message be left on voicemail: yes     Reason for Call: Other: Pt states the procedure he was supposed to have was canceled because he had to gave a different procedure instead but he would like to reschedule the original procedure now.     Pt states the original procedure was in January.     Please call Pt back to discuss.       Thank you    Action Taken: Message routed to:  [facility] IR    Travel Screening: Not Applicable

## 2023-04-06 ENCOUNTER — TRANSCRIBE ORDERS (OUTPATIENT)
Dept: OTHER | Age: 73
End: 2023-04-06

## 2023-04-06 DIAGNOSIS — I73.9 PERIPHERAL VASCULAR DISEASE, UNSPECIFIED (H): Primary | ICD-10-CM

## 2023-04-11 ENCOUNTER — TELEPHONE (OUTPATIENT)
Dept: INTERVENTIONAL RADIOLOGY/VASCULAR | Facility: CLINIC | Age: 73
End: 2023-04-11

## 2023-04-11 NOTE — TELEPHONE ENCOUNTER
"I called and spoke with Deni.  He is being referred to Vascular, pt was seen previously by Dr Licha PATEL at [facility] but would prefer to establish care at the [facility] for his PAD.  Pt is having low back pain and this last week feels his right knee is giving out.  He has come close to falling and is using a cane and walker.  He is only able to walk about 30 feet.  His back pain is what stops him.  He does get right calf  \"alexander horses\" at night about once a week that wakes him.  He has to manipulate his leg to allow it to bend and he dangles that right leg off the bed to help.  He says it's very painful.  Pt states both feet are cold all the time, and feet are \"whitish\" no other color changes.  Pt recently saw a back pain provider here at the [facility] and is seeing someone in [facility] today pain psychologist? Per pt to see if his pain is \"in my head\".  Pt is on eliquis and is currently smoking.  Pt can come to the [facility] on Odd days for appointments. Plan is to review and get needed appt's.  He does need [facility] ride/transport support so early morning appointments are difficult for him.   MARGARETH Hassan, RN, BSN  Interventional Radiology Nurse Coordinator   Phone:  [phone]    "

## 2023-04-18 DIAGNOSIS — I70.213 ATHEROSCLEROSIS OF NATIVE ARTERY OF BOTH LOWER EXTREMITIES WITH INTERMITTENT CLAUDICATION (H): Primary | ICD-10-CM

## 2023-05-02 ENCOUNTER — TRANSFERRED RECORDS (OUTPATIENT)
Dept: HEALTH INFORMATION MANAGEMENT | Facility: CLINIC | Age: 73
End: 2023-05-02

## 2023-05-02 NOTE — PROGRESS NOTES
Interventional Radiology Clinic Visit    Date of this visit: 5/2/2023    Deni Salcido returns for lower extremity pain.     Primary Physician: No Ref-Primary, Physician        History Of Present Illness:    Deni Salcido is a 73 year old male with history of PE, COPD, and peripheral arterial disease status post iliac artery angioplasty and stenting in 2005. Recent lower extremity angiogram from 3/31/2023 demonstrates trace AVF in the left femoral vessels. Patient reports bilateral leg and thigh pain after walking up to 30 feet. He also has chronic lower back and bilateral knee/shoulder pain.     Review of Systems:    As above    Past Medical/Surgical History:    Past Medical History:   Diagnosis Date     COPD (chronic obstructive pulmonary disease) (H)      Depressive disorder      DVT (deep venous thrombosis) (H)      Past Surgical History:   Procedure Laterality Date     CHOLECYSTECTOMY  2018    GI BLEED AFTER, NICKED BLOOD VESSEL     IR LOWER EXTREMITY ANGIOGRAM LEFT  3/7/2023     ORTHOPEDIC SURGERY      R ARM PLATE; R HAND SURGERY; L ARM FRACTURE X 4     VASCULAR SURGERY      LE STENT LEFT LEG       Current Medications:    Current Outpatient Medications   Medication Sig Dispense Refill     acetaminophen (TYLENOL) 500 MG tablet 1,000 mg       albuterol (PROAIR HFA/PROVENTIL HFA/VENTOLIN HFA) 108 (90 Base) MCG/ACT inhaler INHALE 2 PUFFS BY MOUTH FOUR TIMES A DAY AS NEEDED FOR BREATHING, WAIT AT LEAST 1-5 MINUTES BETWEEN EACH PUFF AS NEED FOR SHORTNESS OF BREATH       gabapentin (NEURONTIN) 300 MG capsule 600 mg       mirtazapine (REMERON) 30 MG tablet 60 mg       tiotropium (SPIRIVA) 18 MCG inhaled capsule        venlafaxine (EFFEXOR) 75 MG tablet Take 75 mg by mouth         Allergies:    Other environmental allergy    Family History:    No family history on file.    Social History:    Social History     Socioeconomic History     Marital status:    Tobacco Use     Smoking status: Every  Day     Packs/day: 0.75     Years: 55.00     Pack years: 41.25     Types: Cigarettes     Smokeless tobacco: Never   Vaping Use     Vaping status: Never Used   Substance and Sexual Activity     Alcohol use: Not Currently     Comment: QUIT 2010     Drug use: Yes     Types: Marijuana       Physical Exam:    There were no vitals taken for this visit.     GENERAL APPEARANCE: healthy, alert and no distress  PSYCHIATRIC: mentation appears normal and affect normal.  NEURO: normal speech and movements  EYES: No jaundice.  SKIN: No jaundice.   RESP: lungs clear to auscultation - no rales, rhonchi or wheezes  CARDIOVASCULAR: regular rates and rhythm, normal S1 S2, no S3 or S4 and no murmur  ABDOMEN:  soft, nontender, no masses and bowel sounds normal.  MUSCULOSKELETAL: No edema in the lower extremities. No wound or ulcer in the lower extremities.    Laboratory Studies:    Lab Results   Component Value Date    HGB 15.3 03/07/2023     Lab Results   Component Value Date     03/07/2023     Lab Results   Component Value Date    WBC 7.9 03/07/2023       Lab Results   Component Value Date    INR 0.94 03/07/2023       No results found for: PROTTOTAL   No results found for: ALBUMIN  No results found for: BILITOTAL  No results found for: BILICONJ   No results found for: ALKPHOS  No results found for: AST  No results found for: ALT    Lab Results   Component Value Date    CR 0.91 03/07/2023     Lab Results   Component Value Date    BUN 12.3 03/07/2023       No results found for: FETO    Imaging:     I personally reviewed and interpreted the lower extremity ultrasound and CTA from 5/2/2023. It shows no significant stenosis of lower extremity arteries. Normal CYNDIE on the right and high borderline CYNDIE on the left.     ASSESSMENT/PLAN:      Deni Salcido is a 73 year old male with history of PAD who presents for symmetric pain in lower extremities. His pain is exacerbated by walking and greatly limited his mobility. However,  his CTA and CYNDIE didn't show significant narrowing in the lower extremity arteries. His AVF in left lower extremity is not a flow limiting factor base on most recent CTA.  We discussed that his pain is not vascular and more likely musculoskeletal in nature. No intervention is indicated at this time. [facility].    It was a pleasure seeing the patient.       I saw and examined the patient with the resident and agree with the assessment and plan. Bilateral lower extremity pain not in keeping with arterial ischemia with normal CTA and right side CYNDIE. Mildly diminished left CYNDIE likely due to presence of a small vessel left groin arteriovenous fistula, unable to be treated endovascularly due to small communicating vessel (s/p 2 prior angiograms).    Macario Green MD    CC  Patient Care Team:  No Ref-Primary, Physician as PCP - RICH Bustos

## 2023-05-03 ENCOUNTER — ANCILLARY PROCEDURE (OUTPATIENT)
Dept: ULTRASOUND IMAGING | Facility: CLINIC | Age: 73
End: 2023-05-03
Attending: RADIOLOGY
Payer: COMMERCIAL

## 2023-05-03 ENCOUNTER — ANCILLARY PROCEDURE (OUTPATIENT)
Dept: CT IMAGING | Facility: CLINIC | Age: 73
End: 2023-05-03
Attending: RADIOLOGY
Payer: COMMERCIAL

## 2023-05-03 ENCOUNTER — OFFICE VISIT (OUTPATIENT)
Dept: VASCULAR SURGERY | Facility: CLINIC | Age: 73
End: 2023-05-03
Payer: COMMERCIAL

## 2023-05-03 VITALS — HEART RATE: 64 BPM | OXYGEN SATURATION: 97 % | SYSTOLIC BLOOD PRESSURE: 132 MMHG | DIASTOLIC BLOOD PRESSURE: 82 MMHG

## 2023-05-03 DIAGNOSIS — I70.213 ATHEROSCLEROSIS OF NATIVE ARTERY OF BOTH LOWER EXTREMITIES WITH INTERMITTENT CLAUDICATION (H): ICD-10-CM

## 2023-05-03 DIAGNOSIS — I73.9 PERIPHERAL VASCULAR DISEASE, UNSPECIFIED (H): ICD-10-CM

## 2023-05-03 LAB
CREAT BLD-MCNC: 1.2 MG/DL (ref 0.7–1.3)
GFR SERPL CREATININE-BSD FRML MDRD: >60 ML/MIN/1.73M2

## 2023-05-03 PROCEDURE — 73706 CT ANGIO LWR EXTR W/O&W/DYE: CPT | Mod: GC | Performed by: RADIOLOGY

## 2023-05-03 PROCEDURE — 93922 UPR/L XTREMITY ART 2 LEVELS: CPT | Mod: GC | Performed by: STUDENT IN AN ORGANIZED HEALTH CARE EDUCATION/TRAINING PROGRAM

## 2023-05-03 PROCEDURE — 99213 OFFICE O/P EST LOW 20 MIN: CPT | Mod: GC | Performed by: RADIOLOGY

## 2023-05-03 PROCEDURE — 82565 ASSAY OF CREATININE: CPT | Performed by: PATHOLOGY

## 2023-05-03 RX ORDER — IOPAMIDOL 755 MG/ML
135 INJECTION, SOLUTION INTRAVASCULAR ONCE
Status: COMPLETED | OUTPATIENT
Start: 2023-05-03 | End: 2023-05-03

## 2023-05-03 RX ADMIN — IOPAMIDOL 135 ML: 755 INJECTION, SOLUTION INTRAVASCULAR at 14:52

## 2023-05-03 NOTE — LETTER
5/3/2023       RE: Deni Salcido  [address]  [address]  [facility] [address]       Dear Colleague,    Thank you for referring your patient, Deni Salcido, to the [facility] at [facility]. Please see a copy of my visit note below.        Interventional Radiology Clinic Visit    Date of this visit: 5/2/2023    Deni Salcido returns for lower extremity pain.     Primary Physician: No Ref-Primary, Physician        History Of Present Illness:    Deni Salcido is a 73 year old male with history of PE, COPD, and peripheral arterial disease status post iliac artery angioplasty and stenting in 2005. Recent lower extremity angiogram from 3/31/2023 demonstrates trace AVF in the left femoral vessels. Patient reports bilateral leg and thigh pain after walking up to 30 feet. He also has chronic lower back and bilateral knee/shoulder pain.     Review of Systems:    As above    Past Medical/Surgical History:    Past Medical History:   Diagnosis Date    COPD (chronic obstructive pulmonary disease) (H)     Depressive disorder     DVT (deep venous thrombosis) (H)      Past Surgical History:   Procedure Laterality Date    CHOLECYSTECTOMY  2018    GI BLEED AFTER, NICKED BLOOD VESSEL    IR LOWER EXTREMITY ANGIOGRAM LEFT  3/7/2023    ORTHOPEDIC SURGERY      R ARM PLATE; R HAND SURGERY; L ARM FRACTURE X 4    VASCULAR SURGERY      LE STENT LEFT LEG       Current Medications:    Current Outpatient Medications   Medication Sig Dispense Refill    acetaminophen (TYLENOL) 500 MG tablet 1,000 mg      albuterol (PROAIR HFA/PROVENTIL HFA/VENTOLIN HFA) 108 (90 Base) MCG/ACT inhaler INHALE 2 PUFFS BY MOUTH FOUR TIMES A DAY AS NEEDED FOR BREATHING, WAIT AT LEAST 1-5 MINUTES BETWEEN EACH PUFF AS NEED FOR SHORTNESS OF BREATH      gabapentin (NEURONTIN) 300 MG capsule 600 mg      mirtazapine (REMERON) 30 MG tablet 60 mg      tiotropium (SPIRIVA) 18  MCG inhaled capsule       venlafaxine (EFFEXOR) 75 MG tablet Take 75 mg by mouth         Allergies:    Other environmental allergy    Family History:    No family history on file.    Social History:    Social History     Socioeconomic History    Marital status:    Tobacco Use    Smoking status: Every Day     Packs/day: 0.75     Years: 55.00     Pack years: 41.25     Types: Cigarettes    Smokeless tobacco: Never   Vaping Use    Vaping status: Never Used   Substance and Sexual Activity    Alcohol use: Not Currently     Comment: QUIT 2010    Drug use: Yes     Types: Marijuana       Physical Exam:    There were no vitals taken for this visit.     GENERAL APPEARANCE: healthy, alert and no distress  PSYCHIATRIC: mentation appears normal and affect normal.  NEURO: normal speech and movements  EYES: No jaundice.  SKIN: No jaundice.   RESP: lungs clear to auscultation - no rales, rhonchi or wheezes  CARDIOVASCULAR: regular rates and rhythm, normal S1 S2, no S3 or S4 and no murmur  ABDOMEN:  soft, nontender, no masses and bowel sounds normal.  MUSCULOSKELETAL: No edema in the lower extremities. No wound or ulcer in the lower extremities.    Laboratory Studies:    Lab Results   Component Value Date    HGB 15.3 03/07/2023     Lab Results   Component Value Date     03/07/2023     Lab Results   Component Value Date    WBC 7.9 03/07/2023       Lab Results   Component Value Date    INR 0.94 03/07/2023       No results found for: PROTTOTAL   No results found for: ALBUMIN  No results found for: BILITOTAL  No results found for: BILICONJ   No results found for: ALKPHOS  No results found for: AST  No results found for: ALT    Lab Results   Component Value Date    CR 0.91 03/07/2023     Lab Results   Component Value Date    BUN 12.3 03/07/2023       No results found for: FETO    Imaging:     I personally reviewed and interpreted the lower extremity ultrasound and CTA from 5/2/2023. It shows no significant stenosis of lower  extremity arteries. Normal CYNDIE on the right and high borderline CYNDIE on the left.     ASSESSMENT/PLAN:      Deni Salcido is a 73 year old male with history of PAD who presents for symmetric pain in lower extremities. His pain is exacerbated by walking and greatly limited his mobility. However, his CTA and CYNDIE didn't show significant narrowing in the lower extremity arteries. His AVF in left lower extremity is not a flow limiting factor base on most recent CTA.  We discussed that his pain is not vascular and more likely musculoskeletal in nature. No intervention is indicated at this time. Patient will schedule an [facility] for [facility].    It was a pleasure seeing the patient.       I saw and examined the patient with the resident and agree with the assessment and plan. Bilateral lower extremity pain not in keeping with arterial ischemia with normal CTA and right side CYNDIE. Mildly diminished left CYNDIE likely due to presence of a small vessel left groin arteriovenous fistula, unable to be treated endovascularly due to small communicating vessel (s/p 2 prior angiograms).          Again, thank you for allowing me to participate in the care of your patient.      Sincerely,    Macario Green MD

## 2023-05-03 NOTE — NURSING NOTE
Chief Complaint   Patient presents with     New Patient     Peripheral vascular disease           Vitals were taken and medications reconciled.     Chela Paiz, Visit Facilitator    3:44 PM

## (undated) RX ORDER — LIDOCAINE HYDROCHLORIDE 10 MG/ML
INJECTION, SOLUTION EPIDURAL; INFILTRATION; INTRACAUDAL; PERINEURAL
Status: DISPENSED
Start: 2023-03-07

## (undated) RX ORDER — FENTANYL CITRATE 50 UG/ML
INJECTION, SOLUTION INTRAMUSCULAR; INTRAVENOUS
Status: DISPENSED
Start: 2023-03-07

## (undated) RX ORDER — SODIUM CHLORIDE 9 MG/ML
INJECTION, SOLUTION INTRAVENOUS
Status: DISPENSED
Start: 2023-03-07